# Patient Record
Sex: MALE | Race: BLACK OR AFRICAN AMERICAN | NOT HISPANIC OR LATINO | URBAN - METROPOLITAN AREA
[De-identification: names, ages, dates, MRNs, and addresses within clinical notes are randomized per-mention and may not be internally consistent; named-entity substitution may affect disease eponyms.]

---

## 2019-08-03 ENCOUNTER — EMERGENCY (EMERGENCY)
Facility: HOSPITAL | Age: 49
LOS: 1 days | Discharge: HOME | End: 2019-08-03
Admitting: EMERGENCY MEDICINE
Payer: SELF-PAY

## 2019-08-03 VITALS
HEART RATE: 105 BPM | TEMPERATURE: 98 F | OXYGEN SATURATION: 97 % | SYSTOLIC BLOOD PRESSURE: 142 MMHG | RESPIRATION RATE: 17 BRPM | DIASTOLIC BLOOD PRESSURE: 89 MMHG

## 2019-08-03 PROCEDURE — 99053 MED SERV 10PM-8AM 24 HR FAC: CPT

## 2019-08-03 PROCEDURE — 99283 EMERGENCY DEPT VISIT LOW MDM: CPT

## 2019-08-03 PROCEDURE — 73590 X-RAY EXAM OF LOWER LEG: CPT | Mod: 26,LT

## 2019-08-03 NOTE — ED ADULT NURSE NOTE - CHIEF COMPLAINT QUOTE
pt arrived ambulatory complaining of laceration to left lower extremity d/t a broken glass. unknown last tdap.

## 2019-08-03 NOTE — ED ADULT TRIAGE NOTE - CHIEF COMPLAINT QUOTE
pt arrived ambulatory complaining of laceration to left lower extremity. unknown last tdap. pt arrived ambulatory complaining of laceration to left lower extremity d/t a broken glass. unknown last tdap.

## 2019-08-03 NOTE — ED PROVIDER NOTE - OBJECTIVE STATEMENT
49 y/o M, denies PMH, presents to ED with laceration to L lower leg from broken glass bottle.  Pt states he accidentally cut his leg at home this morning pta.  Pt denies any other complaints.

## 2019-08-03 NOTE — ED PROVIDER NOTE - CLINICAL SUMMARY MEDICAL DECISION MAKING FREE TEXT BOX
47 y/o M presents to ED with laceration to L lower leg.  Pt well appearing, vSS.  xray wet read negative for foreign body.  Pt eloped without laceration repair.  Tetanus given.
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
## 2019-08-08 DIAGNOSIS — S81.812A LACERATION WITHOUT FOREIGN BODY, LEFT LOWER LEG, INITIAL ENCOUNTER: ICD-10-CM
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
